# Patient Record
Sex: MALE | Race: BLACK OR AFRICAN AMERICAN | NOT HISPANIC OR LATINO | URBAN - METROPOLITAN AREA
[De-identification: names, ages, dates, MRNs, and addresses within clinical notes are randomized per-mention and may not be internally consistent; named-entity substitution may affect disease eponyms.]

---

## 2018-04-24 NOTE — ASU PATIENT PROFILE, ADULT - VISION (WITH CORRECTIVE LENSES IF THE PATIENT USUALLY WEARS THEM):
weard glasses for distance/Partially impaired: cannot see medication labels or newsprint, but can see obstacles in path, and the surrounding layout; can count fingers at arm's length

## 2018-04-25 ENCOUNTER — OUTPATIENT (OUTPATIENT)
Dept: OUTPATIENT SERVICES | Facility: HOSPITAL | Age: 53
LOS: 1 days | Discharge: HOME | End: 2018-04-25
Payer: COMMERCIAL

## 2018-04-25 VITALS
HEIGHT: 74 IN | OXYGEN SATURATION: 99 % | RESPIRATION RATE: 16 BRPM | HEART RATE: 100 BPM | DIASTOLIC BLOOD PRESSURE: 69 MMHG | SYSTOLIC BLOOD PRESSURE: 117 MMHG | WEIGHT: 222.67 LBS | TEMPERATURE: 97 F

## 2018-04-25 VITALS
DIASTOLIC BLOOD PRESSURE: 73 MMHG | RESPIRATION RATE: 13 BRPM | SYSTOLIC BLOOD PRESSURE: 129 MMHG | HEART RATE: 66 BPM | TEMPERATURE: 98 F | OXYGEN SATURATION: 100 %

## 2018-04-25 LAB — GLUCOSE BLDC GLUCOMTR-MCNC: 157 MG/DL — HIGH (ref 70–99)

## 2018-04-25 PROCEDURE — 54405 INSERT MULTI-COMP PENIS PROS: CPT

## 2018-04-25 PROCEDURE — 54360 PENIS PLASTIC SURGERY: CPT

## 2018-04-25 PROCEDURE — 54235 NJX CORPORA CAVERNOSA RX AGT: CPT

## 2018-04-25 PROCEDURE — C1813: CPT

## 2018-04-25 PROCEDURE — 82962 GLUCOSE BLOOD TEST: CPT

## 2018-04-25 RX ORDER — MORPHINE SULFATE 50 MG/1
2 CAPSULE, EXTENDED RELEASE ORAL
Qty: 0 | Refills: 0 | Status: DISCONTINUED | OUTPATIENT
Start: 2018-04-25 | End: 2018-04-25

## 2018-04-25 RX ORDER — ONDANSETRON 8 MG/1
4 TABLET, FILM COATED ORAL ONCE
Qty: 0 | Refills: 0 | Status: DISCONTINUED | OUTPATIENT
Start: 2018-04-25 | End: 2018-04-25

## 2018-04-25 RX ORDER — GENTAMICIN SULFATE 40 MG/ML
80 VIAL (ML) INJECTION ONCE
Qty: 0 | Refills: 0 | Status: DISCONTINUED | OUTPATIENT
Start: 2018-04-25 | End: 2018-04-25

## 2018-04-25 RX ORDER — OXYCODONE AND ACETAMINOPHEN 5; 325 MG/1; MG/1
1 TABLET ORAL ONCE
Qty: 0 | Refills: 0 | Status: DISCONTINUED | OUTPATIENT
Start: 2018-04-25 | End: 2018-04-25

## 2018-04-25 RX ORDER — SODIUM CHLORIDE 9 MG/ML
1000 INJECTION INTRAMUSCULAR; INTRAVENOUS; SUBCUTANEOUS
Qty: 0 | Refills: 0 | Status: DISCONTINUED | OUTPATIENT
Start: 2018-04-25 | End: 2018-04-25

## 2018-04-25 RX ORDER — VANCOMYCIN HCL 1 G
1500 VIAL (EA) INTRAVENOUS EVERY 12 HOURS
Qty: 0 | Refills: 0 | Status: DISCONTINUED | OUTPATIENT
Start: 2018-04-25 | End: 2018-04-25

## 2018-04-25 RX ADMIN — Medication 300 MILLIGRAM(S): at 08:46

## 2018-04-25 NOTE — BRIEF OPERATIVE NOTE - OPERATION/FINDINGS
Difficult dissection/dilation distally. Coloplast Titan 20cm with no rear tip. 75cc reservoir placed in left space of retzius

## 2018-04-25 NOTE — BRIEF OPERATIVE NOTE - PROCEDURE
<<-----Click on this checkbox to enter Procedure Insertion of inflatable penile prosthesis  04/25/2018    Active  [place]

## 2021-01-04 NOTE — ASU PREOP CHECKLIST - ISOLATION PRECAUTIONS
none Render Risk Assessment In Note?: no Other (Free Text): [facility]erral to [facility] if pt wants removed completely. Detail Level: Simple Note Text (......Xxx Chief Complaint.): This diagnosis correlates with the

## 2022-09-07 ENCOUNTER — EMERGENCY (EMERGENCY)
Facility: HOSPITAL | Age: 57
LOS: 1 days | Discharge: HOME | End: 2022-09-07
Attending: STUDENT IN AN ORGANIZED HEALTH CARE EDUCATION/TRAINING PROGRAM | Admitting: STUDENT IN AN ORGANIZED HEALTH CARE EDUCATION/TRAINING PROGRAM
Payer: COMMERCIAL

## 2022-09-07 VITALS
DIASTOLIC BLOOD PRESSURE: 76 MMHG | SYSTOLIC BLOOD PRESSURE: 187 MMHG | HEART RATE: 65 BPM | HEIGHT: 74 IN | TEMPERATURE: 98 F | RESPIRATION RATE: 18 BRPM | OXYGEN SATURATION: 99 %

## 2022-09-07 PROBLEM — I63.9 CEREBRAL INFARCTION, UNSPECIFIED: Chronic | Status: ACTIVE | Noted: 2018-04-24

## 2022-09-07 PROBLEM — E11.9 TYPE 2 DIABETES MELLITUS WITHOUT COMPLICATIONS: Chronic | Status: ACTIVE | Noted: 2018-04-24

## 2022-09-07 PROBLEM — G62.9 POLYNEUROPATHY, UNSPECIFIED: Chronic | Status: ACTIVE | Noted: 2018-04-24

## 2022-09-07 PROBLEM — I10 ESSENTIAL (PRIMARY) HYPERTENSION: Chronic | Status: ACTIVE | Noted: 2018-04-24

## 2022-09-07 LAB
ALBUMIN SERPL ELPH-MCNC: 4.1 G/DL — SIGNIFICANT CHANGE UP (ref 3.3–5)
ALP SERPL-CCNC: 102 U/L — SIGNIFICANT CHANGE UP (ref 40–120)
ALT FLD-CCNC: 11 U/L — SIGNIFICANT CHANGE UP (ref 10–45)
ANION GAP SERPL CALC-SCNC: 12 MMOL/L — SIGNIFICANT CHANGE UP (ref 5–17)
APTT BLD: 33 SEC — SIGNIFICANT CHANGE UP (ref 27.5–35.5)
AST SERPL-CCNC: 14 U/L — SIGNIFICANT CHANGE UP (ref 10–40)
BASOPHILS # BLD AUTO: 0.03 K/UL — SIGNIFICANT CHANGE UP (ref 0–0.2)
BASOPHILS NFR BLD AUTO: 0.3 % — SIGNIFICANT CHANGE UP (ref 0–2)
BILIRUB SERPL-MCNC: 0.6 MG/DL — SIGNIFICANT CHANGE UP (ref 0.2–1.2)
BLD GP AB SCN SERPL QL: NEGATIVE — SIGNIFICANT CHANGE UP
BUN SERPL-MCNC: 17 MG/DL — SIGNIFICANT CHANGE UP (ref 7–23)
CALCIUM SERPL-MCNC: 9.5 MG/DL — SIGNIFICANT CHANGE UP (ref 8.4–10.5)
CHLORIDE SERPL-SCNC: 101 MMOL/L — SIGNIFICANT CHANGE UP (ref 96–108)
CO2 SERPL-SCNC: 27 MMOL/L — SIGNIFICANT CHANGE UP (ref 22–31)
CREAT SERPL-MCNC: 1.14 MG/DL — SIGNIFICANT CHANGE UP (ref 0.5–1.3)
EGFR: 75 ML/MIN/1.73M2 — SIGNIFICANT CHANGE UP
EOSINOPHIL # BLD AUTO: 0.12 K/UL — SIGNIFICANT CHANGE UP (ref 0–0.5)
EOSINOPHIL NFR BLD AUTO: 1.3 % — SIGNIFICANT CHANGE UP (ref 0–6)
GLUCOSE SERPL-MCNC: 185 MG/DL — HIGH (ref 70–99)
HCT VFR BLD CALC: 37.4 % — LOW (ref 39–50)
HGB BLD-MCNC: 12.2 G/DL — LOW (ref 13–17)
IMM GRANULOCYTES NFR BLD AUTO: 0.4 % — SIGNIFICANT CHANGE UP (ref 0–1.5)
INR BLD: 0.98 — SIGNIFICANT CHANGE UP (ref 0.88–1.16)
LYMPHOCYTES # BLD AUTO: 2.13 K/UL — SIGNIFICANT CHANGE UP (ref 1–3.3)
LYMPHOCYTES # BLD AUTO: 23.2 % — SIGNIFICANT CHANGE UP (ref 13–44)
MCHC RBC-ENTMCNC: 27.8 PG — SIGNIFICANT CHANGE UP (ref 27–34)
MCHC RBC-ENTMCNC: 32.6 GM/DL — SIGNIFICANT CHANGE UP (ref 32–36)
MCV RBC AUTO: 85.2 FL — SIGNIFICANT CHANGE UP (ref 80–100)
MONOCYTES # BLD AUTO: 0.68 K/UL — SIGNIFICANT CHANGE UP (ref 0–0.9)
MONOCYTES NFR BLD AUTO: 7.4 % — SIGNIFICANT CHANGE UP (ref 2–14)
NEUTROPHILS # BLD AUTO: 6.17 K/UL — SIGNIFICANT CHANGE UP (ref 1.8–7.4)
NEUTROPHILS NFR BLD AUTO: 67.4 % — SIGNIFICANT CHANGE UP (ref 43–77)
NRBC # BLD: 0 /100 WBCS — SIGNIFICANT CHANGE UP (ref 0–0)
PLATELET # BLD AUTO: 293 K/UL — SIGNIFICANT CHANGE UP (ref 150–400)
POTASSIUM SERPL-MCNC: 3.6 MMOL/L — SIGNIFICANT CHANGE UP (ref 3.5–5.3)
POTASSIUM SERPL-SCNC: 3.6 MMOL/L — SIGNIFICANT CHANGE UP (ref 3.5–5.3)
PROT SERPL-MCNC: 7.5 G/DL — SIGNIFICANT CHANGE UP (ref 6–8.3)
PROTHROM AB SERPL-ACNC: 11.6 SEC — SIGNIFICANT CHANGE UP (ref 10.5–13.4)
RBC # BLD: 4.39 M/UL — SIGNIFICANT CHANGE UP (ref 4.2–5.8)
RBC # FLD: 13.5 % — SIGNIFICANT CHANGE UP (ref 10.3–14.5)
RH IG SCN BLD-IMP: POSITIVE — SIGNIFICANT CHANGE UP
SARS-COV-2 RNA SPEC QL NAA+PROBE: NEGATIVE — SIGNIFICANT CHANGE UP
SODIUM SERPL-SCNC: 140 MMOL/L — SIGNIFICANT CHANGE UP (ref 135–145)
TROPONIN T SERPL-MCNC: 0.01 NG/ML — SIGNIFICANT CHANGE UP (ref 0–0.01)
WBC # BLD: 9.17 K/UL — SIGNIFICANT CHANGE UP (ref 3.8–10.5)
WBC # FLD AUTO: 9.17 K/UL — SIGNIFICANT CHANGE UP (ref 3.8–10.5)

## 2022-09-07 PROCEDURE — 99285 EMERGENCY DEPT VISIT HI MDM: CPT | Mod: 25

## 2022-09-07 PROCEDURE — 96374 THER/PROPH/DIAG INJ IV PUSH: CPT

## 2022-09-07 PROCEDURE — 80053 COMPREHEN METABOLIC PANEL: CPT

## 2022-09-07 PROCEDURE — 85730 THROMBOPLASTIN TIME PARTIAL: CPT

## 2022-09-07 PROCEDURE — 70450 CT HEAD/BRAIN W/O DYE: CPT | Mod: MA

## 2022-09-07 PROCEDURE — 96376 TX/PRO/DX INJ SAME DRUG ADON: CPT

## 2022-09-07 PROCEDURE — 72146 MRI CHEST SPINE W/O DYE: CPT | Mod: MA

## 2022-09-07 PROCEDURE — 93005 ELECTROCARDIOGRAM TRACING: CPT

## 2022-09-07 PROCEDURE — 85610 PROTHROMBIN TIME: CPT

## 2022-09-07 PROCEDURE — 70496 CT ANGIOGRAPHY HEAD: CPT | Mod: 26,MA

## 2022-09-07 PROCEDURE — 86900 BLOOD TYPING SEROLOGIC ABO: CPT

## 2022-09-07 PROCEDURE — 87635 SARS-COV-2 COVID-19 AMP PRB: CPT

## 2022-09-07 PROCEDURE — 70496 CT ANGIOGRAPHY HEAD: CPT | Mod: MA

## 2022-09-07 PROCEDURE — 70498 CT ANGIOGRAPHY NECK: CPT | Mod: MA

## 2022-09-07 PROCEDURE — 86850 RBC ANTIBODY SCREEN: CPT

## 2022-09-07 PROCEDURE — 72148 MRI LUMBAR SPINE W/O DYE: CPT | Mod: MA

## 2022-09-07 PROCEDURE — 0042T: CPT | Mod: MA

## 2022-09-07 PROCEDURE — 82962 GLUCOSE BLOOD TEST: CPT

## 2022-09-07 PROCEDURE — 97162 PT EVAL MOD COMPLEX 30 MIN: CPT

## 2022-09-07 PROCEDURE — 70498 CT ANGIOGRAPHY NECK: CPT | Mod: 26,MA

## 2022-09-07 PROCEDURE — 72148 MRI LUMBAR SPINE W/O DYE: CPT | Mod: 26,MA

## 2022-09-07 PROCEDURE — 84484 ASSAY OF TROPONIN QUANT: CPT

## 2022-09-07 PROCEDURE — 72146 MRI CHEST SPINE W/O DYE: CPT | Mod: 26,MA

## 2022-09-07 PROCEDURE — 86901 BLOOD TYPING SEROLOGIC RH(D): CPT

## 2022-09-07 PROCEDURE — 71045 X-RAY EXAM CHEST 1 VIEW: CPT

## 2022-09-07 PROCEDURE — 71045 X-RAY EXAM CHEST 1 VIEW: CPT | Mod: 26

## 2022-09-07 PROCEDURE — 85025 COMPLETE CBC W/AUTO DIFF WBC: CPT

## 2022-09-07 PROCEDURE — 99283 EMERGENCY DEPT VISIT LOW MDM: CPT

## 2022-09-07 PROCEDURE — 36415 COLL VENOUS BLD VENIPUNCTURE: CPT

## 2022-09-07 PROCEDURE — 96375 TX/PRO/DX INJ NEW DRUG ADDON: CPT

## 2022-09-07 RX ORDER — MORPHINE SULFATE 50 MG/1
2 CAPSULE, EXTENDED RELEASE ORAL ONCE
Refills: 0 | Status: DISCONTINUED | OUTPATIENT
Start: 2022-09-07 | End: 2022-09-07

## 2022-09-07 RX ORDER — METHOCARBAMOL 500 MG/1
1500 TABLET, FILM COATED ORAL ONCE
Refills: 0 | Status: COMPLETED | OUTPATIENT
Start: 2022-09-07 | End: 2022-09-07

## 2022-09-07 RX ORDER — KETOROLAC TROMETHAMINE 30 MG/ML
15 SYRINGE (ML) INJECTION ONCE
Refills: 0 | Status: DISCONTINUED | OUTPATIENT
Start: 2022-09-07 | End: 2022-09-07

## 2022-09-07 RX ORDER — ACETAMINOPHEN 500 MG
975 TABLET ORAL ONCE
Refills: 0 | Status: COMPLETED | OUTPATIENT
Start: 2022-09-07 | End: 2022-09-07

## 2022-09-07 RX ADMIN — Medication 975 MILLIGRAM(S): at 19:13

## 2022-09-07 RX ADMIN — METHOCARBAMOL 1500 MILLIGRAM(S): 500 TABLET, FILM COATED ORAL at 15:41

## 2022-09-07 RX ADMIN — Medication 15 MILLIGRAM(S): at 16:42

## 2022-09-07 RX ADMIN — MORPHINE SULFATE 2 MILLIGRAM(S): 50 CAPSULE, EXTENDED RELEASE ORAL at 19:13

## 2022-09-07 RX ADMIN — Medication 15 MILLIGRAM(S): at 15:40

## 2022-09-07 RX ADMIN — Medication 975 MILLIGRAM(S): at 19:44

## 2022-09-07 RX ADMIN — MORPHINE SULFATE 2 MILLIGRAM(S): 50 CAPSULE, EXTENDED RELEASE ORAL at 19:44

## 2022-09-07 RX ADMIN — Medication 15 MILLIGRAM(S): at 22:15

## 2022-09-07 NOTE — ED PROVIDER NOTE - CLINICAL SUMMARY MEDICAL DECISION MAKING FREE TEXT BOX
Patient with high suspicion for ischemic vs hemorrhagic stroke.     Stroke code activated    Work up: CT head and CT perfusion, EKG, CBC, BMP, VBG.    R/o metabolic and peripheral causes of symptoms     Possible msk etiology as pt has right lumbar muscular spasms that could be contributing to symptoms. Patient with high suspicion for ischemic vs hemorrhagic stroke.   Stroke code activated at triage  Work up: CT head, CTA, and CT perfusion, EKG, CBC, CMP  R/o metabolic and peripheral causes of symptoms  Possible msk etiology as pt has right lumbar muscular spasms that could be contributing to symptoms.

## 2022-09-07 NOTE — ED PROVIDER NOTE - PHYSICAL EXAMINATION
CONSTITUTIONAL: Non-toxic; in no apparent distress, mild dysarthria (baseline)  HEAD: Normocephalic; atraumatic  EYES: PERRL; EOM intact   ENMT: External appears normal  NECK: Supple; non-tender  CARD: Normal S1, S2; no murmurs, rubs, or gallops  RESP: Normal chest excursion with respiration; breath sounds clear and equal bilaterally  ABD: Soft, non-distended; non-tender  EXT: Normal ROM in all four extremities; non-tender to palpation, + RLE straight leg raise positive  SKIN: Warm, dry, no rash  NEURO: No focal neurological deficiencies, CN 2-12 intact BL, no dysmetria, no pronator drift, strength/sensation intact throughout, normal cognition

## 2022-09-07 NOTE — PHYSICAL THERAPY INITIAL EVALUATION ADULT - BALANCE TRAINING, PT EVAL
Pt will be able to put dress/shirt on while standing independently 4/4 times without LOB   Pt will be able to don socks independently while sitting 4/4 times without LOB

## 2022-09-07 NOTE — PHYSICAL THERAPY INITIAL EVALUATION ADULT - DIAGNOSIS, PT EVAL
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

## 2022-09-07 NOTE — ED PROVIDER NOTE - PATIENT PORTAL LINK FT
You can access the FollowMyHealth Patient Portal offered by [facility] by registering at the following website: [email or web address]. By joining [employer]’s FollowMyHealth portal, you will also be able to view your health information using other applications (apps) compatible with our system.

## 2022-09-07 NOTE — PHYSICAL THERAPY INITIAL EVALUATION ADULT - MODALITIES TREATMENT COMMENTS
(L) hand  4/5, (R) hand  5/5. CN Testing: B/L Frontalis intact; B/L buccinator intact; smile symmetrical; tongue protrusion at midline; B/L eyes open/close intact; Shoulder elevation: intact bilaterally; Vision H-Test: bilateral tracking and smooth pursuit intact; Convergence/Divergence: intact; Vision Quadrant Test: intact bilaterally Lethargic, AAO x2 (himself, hospital (reports [facility]), Sep 10, 2022). Patient p/w decreased LUE strength (MMT 4/5 t/o, baseline), b/l LEs and RUE (MMT 5/5) otherwise intact neuro exam. Patient required Mod A for bed mobilities. Session was limited to dangling on edge of bed for 10 minutes due to c/o 10/10 in LBP radiating into right gluteal area (started this morning). Emotional. Stable BP. Well saturated on RA. Denies dizziness, SOB, Headache, chest pain. Will benefit from skilled PT to optimize functional independence.

## 2022-09-07 NOTE — ED PROVIDER NOTE - PROGRESS NOTE DETAILS
Stroke signed off, no concern for central cause of symptoms. Pt has MSK tightness in paraspinal lumbar back, will give mm relaxers and re-assess. Pt evaluated by PT, however could not stand 2/2 discomfort and possible weakness of RLE. He notes ongoing urinary incontinence for past 2 mo for which he wears depends - he states he has not shared these symptoms with his neurology team. No saddle paresthesia, bowel symptoms, fever, night sweats, weight loss.  Considering urinary symptoms, weakness, and RLE numbness, will consult neurosurgery and obtain MRI of T and L spine. Pt ambulatory in ED, using cane. MRI neg, results discussed w NSx. Will dc w Nsx f/u and return precautions.

## 2022-09-07 NOTE — ED PROVIDER NOTE - NSFOLLOWUPINSTRUCTIONS_ED_ALL_ED_FT
Follow up with your primary medical doctor as soon as possible.  Follow up with neurosurgery - call [phone] to schedule an appointment.  Return to the emergency department if your symptoms worsen or if you develop new symptoms.    Sciatica  ImageSciatica is pain, numbness, weakness, or tingling along the path of the sciatic nerve. The sciatic nerve starts in the lower back and runs down the back of each leg. The nerve controls the muscles in the lower leg and in the back of the knee. It also provides feeling (sensation) to the back of the thigh, the lower leg, and the sole of the foot. Sciatica is a symptom of another medical condition that pinches or puts pressure on the sciatic nerve.    Generally, sciatica only affects one side of the body. Sciatica usually goes away on its own or with treatment. In some cases, sciatica may keep coming back (recur).    What are the causes?  This condition is caused by pressure on the sciatic nerve, or pinching of the sciatic nerve. This may be the result of:    A disk in between the bones of the spine (vertebrae) bulging out too far (herniated disk).  Age-related changes in the spinal disks (degenerative disk disease).  A pain disorder that affects a muscle in the buttock (piriformis syndrome).  Extra bone growth (bone spur) near the sciatic nerve.  An injury or break (fracture) of the pelvis.  Pregnancy.  Tumor (rare).    What increases the risk?  The following factors may make you more likely to develop this condition:    Playing sports that place pressure or stress on the spine, such as football or weight lifting.  Having poor strength and flexibility.  A history of back injury.  A history of back surgery.  Sitting for long periods of time.  Doing activities that involve repetitive bending or lifting.  Obesity.    What are the signs or symptoms?  Symptoms can vary from mild to very severe, and they may include:    Any of these problems in the lower back, leg, hip, or buttock:    Mild tingling or dull aches.  Burning sensations.  Sharp pains.    Numbness in the back of the calf or the sole of the foot.  Leg weakness.  Severe back pain that makes movement difficult.    These symptoms may get worse when you cough, sneeze, or laugh, or when you sit or stand for long periods of time. Being overweight may also make symptoms worse. In some cases, symptoms may recur over time.    How is this diagnosed?  This condition may be diagnosed based on:    Your symptoms.  A physical exam. Your health care provider may ask you to do certain movements to check whether those movements trigger your symptoms.  You may have tests, including:    Blood tests.  X-rays.  MRI.  CT scan.      How is this treated?  In many cases, this condition improves on its own, without any treatment. However, treatment may include:    Reducing or modifying physical activity during periods of pain.  Exercising and stretching to strengthen your abdomen and improve the flexibility of your spine.  Icing and applying heat to the affected area.  Medicines that help:    To relieve pain and swelling.  To relax your muscles.    Injections of medicines that help to relieve pain, irritation, and inflammation around the sciatic nerve (steroids).  Surgery.    Follow these instructions at home:  Medicines     Take over-the-counter and prescription medicines only as told by your health care provider.  Do not drive or operate heavy machinery while taking prescription pain medicine.  Managing pain     If directed, apply ice to the affected area.    Put ice in a plastic bag.  Place a towel between your skin and the bag.  Leave the ice on for 20 minutes, 2–3 times a day.    After icing, apply heat to the affected area before you exercise or as often as told by your health care provider. Use the heat source that your health care provider recommends, such as a moist heat pack or a heating pad.    Place a towel between your skin and the heat source.  Leave the heat on for 20–30 minutes.  Remove the heat if your skin turns bright red. This is especially important if you are unable to feel pain, heat, or cold. You may have a greater risk of getting burned.    Activity     Return to your normal activities as told by your health care provider. Ask your health care provider what activities are safe for you.    Avoid activities that make your symptoms worse.    Take brief periods of rest throughout the day. Resting in a lying or standing position is usually better than sitting to rest.    When you rest for longer periods, mix in some mild activity or stretching between periods of rest. This will help to prevent stiffness and pain.  Avoid sitting for long periods of time without moving. Get up and move around at least one time each hour.    Exercise and stretch regularly, as told by your health care provider.  Do not lift anything that is heavier than 10 lb (4.5 kg) while you have symptoms of sciatica. When you do not have symptoms, you should still avoid heavy lifting, especially repetitive heavy lifting.  When you lift objects, always use proper lifting technique, which includes:    Bending your knees.  Keeping the load close to your body.  Avoiding twisting.    General instructions     Use good posture.    Avoid leaning forward while sitting.  Avoid hunching over while standing.    Maintain a healthy weight. Excess weight puts extra stress on your back and makes it difficult to maintain good posture.  Wear supportive, comfortable shoes. Avoid wearing high heels.  Avoid sleeping on a mattress that is too soft or too hard. A mattress that is firm enough to support your back when you sleep may help to reduce your pain.  Keep all follow-up visits as told by your health care provider. This is important.  Contact a health care provider if:  You have pain that wakes you up when you are sleeping.  You have pain that gets worse when you lie down.  Your pain is worse than you have experienced in the past.  Your pain lasts longer than 4 weeks.  You experience unexplained weight loss.  Get help right away if:  You lose control of your bowel or bladder (incontinence).  You have:    Weakness in your lower back, pelvis, buttocks, or legs that gets worse.  Redness or swelling of your back.  A burning sensation when you urinate.    This information is not intended to replace advice given to you by your health care provider. Make sure you discuss any questions you have with your health care provider.

## 2022-09-07 NOTE — PHYSICAL THERAPY INITIAL EVALUATION ADULT - ADDITIONAL COMMENTS
Patient lives with sister in private house with 20 steps to enter. Patient employed in [employer]. Does not use any Assistive Devices at baseline. Owns RW. Denies recent Hx of falls.

## 2022-09-07 NOTE — CONSULT NOTE ADULT - SUBJECTIVE AND OBJECTIVE BOX
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 States his right leg gave out at  while he was walking with a cane. Pt describes similar episode of right buttock/right leg sciatic type pains in April 2022 that resolved on their own. Reports that he did not take any medication for the pain and that the pain is cramping in nature, 10/10 at its worse and intermittent. He cannot describe any aggravating or alleviating factors in correlation to the pain. He also reports that he has had some intermittent urinary incontinence over the past 2 months, but denies fecal incontinence or saddle anesthesia. Pt also noted right lumbar muscular tightness / spasm since this morning.     Denies weakness, changes in sensation, fecal incontinence, saddle anesthesia, dizziness, vision changes, headache, or recent trauma, falls or inciting events.     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 Has 2 flights of stairs at home, intermittently uses a cane at home.     Tobacco Use: denies   EtOH use: Socially  Substance: denies     Allergies    No Known Allergies    Intolerances        REVIEW OF SYSTEMS  General:	  All ROS negative except those listed in above HPI       MEDICATIONS:  Antibiotics:    Neuro:    Anticoagulation:    OTHER:    IVF:      Vital Signs Last 24 Hrs  T(C): 36.4 (07 Sep 2022 18:41), Max: 36.7 (07 Sep 2022 14:03)  T(F): 97.6 (07 Sep 2022 18:41), Max: 98.1 (07 Sep 2022 14:03)  HR: 64 (07 Sep 2022 18:41) (55 - 74)  BP: 166/94 (07 Sep 2022 18:41) (140/74 - 187/76)  BP(mean): --  RR: 18 (07 Sep 2022 18:41) (18 - 18)  SpO2: 98% (07 Sep 2022 18:41) (95% - 100%)    Parameters below as of 07 Sep 2022 18:41  Patient On (Oxygen Delivery Method): room air      PHYSICAL EXAM:  General: NAD, pt is comfortably sitting up in bed, on room air  HEENT: CN II-XII grossly intact, PERRL 3mm briskly reactive, EOMI b/l, face symmetric, tongue midline, neck FROM  Cardiovascular: RRR, normal S1 and S2   Respiratory: lungs CTAB, no wheezing, rhonchi, or crackles   GI: normoactive BS to auscultation, abd soft, NTND   Neuro: A&Ox3, No aphasia, speech clear, no dysmetria, no pronator drift. Follows commands. GU x4 spontaneously, 5/5 strength in all extremities throughout. SILT throughout. +Right lumbar paraspinal tenderness to palpation. Negative Martin's or clonus, normoreflexic throughout.    Extremities: distal pulses 2+ x4    LABS:                        12.2   9.17  )-----------( 293      ( 07 Sep 2022 14:32 )             37.4     09-07    140  |  101  |  17  ----------------------------<  185<H>  3.6   |  27  |  1.14    Ca    9.5      07 Sep 2022 14:32    TPro  7.5  /  Alb  4.1  /  TBili  0.6  /  DBili  x   /  AST  14  /  ALT  11  /  AlkPhos  102  09-07    PT/INR - ( 07 Sep 2022 14:32 )   PT: 11.6 sec;   INR: 0.98          PTT - ( 07 Sep 2022 14:32 )  PTT:33.0 sec    CULTURES:      RADIOLOGY & ADDITIONAL STUDIES:  MRI lumbar pending     Assessment:  58 y/o M with PMHx DM, HTN, 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 D'Amico

## 2022-09-07 NOTE — ED PROVIDER NOTE - OBJECTIVE STATEMENT
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 Last known normal was prior to 8am this morning. Pt states his right leg gave out at 8am while he was walking with a cane. Pt also c/o numbness to right leg which he has not felt since April during prior CVA. No changes in speech from baseline per family. Denies other weakness or numbness. Pt also noted right lumbar muscular tightness / spasm since this morning. Denies dizziness, vision changes, headache, or recent trauma. 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 Last known normal was prior to 8am this morning. Pt states his right leg gave out at 8am while he was walking with a cane. Pt also c/o numbness to right leg that started at the same time which he has not felt since April during prior CVA. No changes in speech from baseline per family. Denies other weakness or numbness. Pt also noted right lumbar muscular tightness / spasm since this morning. Denies dizziness, vision changes, headache, or recent trauma.

## 2022-09-07 NOTE — ED ADULT NURSE NOTE - NSICDXPASTMEDICALHX_GEN_ALL_CORE_FT
PAST MEDICAL HISTORY:  DM (diabetes mellitus)     HTN (hypertension)     Peripheral neuritis     Stroke right hemiparesis

## 2022-09-07 NOTE — ED ADULT TRIAGE NOTE - CHIEF COMPLAINT QUOTE
Pt c/o right-sided weakness and leg "giving out" at 0800 this morning. Pt worked overnight and noticed the symptoms started at that time. Pt has hx of CVA x2, most recently this year, with residual left-sided weakness and slurred speech. Pt states he can normally walk around despite weakness, but is now unable to walk d/t weakness and numbness to right leg. Denies dizziness, vision changes, headache.

## 2022-09-07 NOTE — PHYSICAL THERAPY INITIAL EVALUATION ADULT - PERTINENT HX OF CURRENT PROBLEM, REHAB EVAL
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 Last known normal was prior to 8am this morning. Pt states his right leg gave out at 8am while he was walking with a cane. Pt also c/o numbness to right leg which he has not felt since April during prior CVA. No changes in speech from baseline per family. Denies other weakness or numbness. Pt also noted right lumbar muscular tightness / spasm since this morning. Denies dizziness, vision changes, headache, or recent trauma.

## 2022-09-07 NOTE — CONSULT NOTE ADULT - SUBJECTIVE AND OBJECTIVE BOX
**STROKE CODE CONSULT NOTE**    Last known well time: 0800    HPI: 57y Male with PMHx of T2DM, CVA with residual, aphasia, dysarthria (2017), peripheral neuritis presents to ED c/o right leg weakness. LKW 0800. Pt states he was walking with his cane and felt a sharp pain in the right hip that shot down to his foot. Pt felt his right leg giving out and buckling underneath him due to the pain, then started to experience numbness in his right leg. Pt states he had a similar feeling in April. Pt also c/o right lumbar "spasm" by his hip since morning and c/o pain to this area. Stroke code called on arrival, NIHSS 2 for baseline dysarthria and word finding difficulties. CTH negative for acute findings. CTP some slowing likely artifact, CTA H/N negative. Pt is not a candidate for alteplase as he is out of the window. Pt states numbness has resolved and is c/o right hip/lumbar pain. Denies weakness/numbness of extremities, headache, blurry vision, dizziness.     T(C): 36.7 (09-07-22 @ 15:09), Max: 36.7 (09-07-22 @ 14:03)  HR: 55 (09-07-22 @ 15:09) (55 - 72)  BP: 140/74 (09-07-22 @ 15:09) (140/74 - 187/76)  RR: 18 (09-07-22 @ 15:09) (18 - 18)  SpO2: 95% (09-07-22 @ 15:09) (95% - 100%)    PAST MEDICAL & SURGICAL HISTORY:  DM (diabetes mellitus)      HTN (hypertension)      Stroke  right hemiparesis      Peripheral neuritis      No significant past surgical history          FAMILY HISTORY:      SOCIAL HISTORY:   Smoking status: denies  Drinking: denies  Drug Use:  denies    ROS:   Constitutional: No fever, weight loss or fatigue  Eyes: No eye pain, visual disturbances, or discharge  ENMT:  No difficulty hearing, tinnitus; No sinus or throat pain  Neck: No pain or stiffness  Respiratory: No cough, wheezing, chills or hemoptysis  Cardiovascular: No chest pain, palpitations, shortness of breath, or leg swelling  Gastrointestinal: No abdominal pain. No nausea, vomiting or hematemesis; No diarrhea or constipation. Nohematochezia.  Genitourinary: No dysuria, frequency, hematuria or incontinence  Neurological: As per HPI  Skin: No itching, burning, rashes or lesions   Endocrine: No heat or cold intolerance; No hair loss  Musculoskeletal: No joint pain or swelling; No muscle, back or extremity pain  Heme/Lymph: No easy bruising or bleeding gums    MEDICATIONS  (STANDING):    MEDICATIONS  (PRN):    Allergies    No Known Allergies    Intolerances      Vital Signs Last 24 Hrs  T(C): 36.7 (07 Sep 2022 15:09), Max: 36.7 (07 Sep 2022 14:03)  T(F): 98 (07 Sep 2022 15:09), Max: 98.1 (07 Sep 2022 14:03)  HR: 55 (07 Sep 2022 15:09) (55 - 72)  BP: 140/74 (07 Sep 2022 15:09) (140/74 - 187/76)  BP(mean): --  RR: 18 (07 Sep 2022 15:09) (18 - 18)  SpO2: 95% (07 Sep 2022 15:09) (95% - 100%)    Parameters below as of 07 Sep 2022 14:21  Patient On (Oxygen Delivery Method): room air        Physical exam:  Constitutional: No acute distress, conversant    Neurologic:  -Mental status: Awake, alert, oriented to person, place, and time. Speech is fluent with intact naming, repetition, and comprehension, mild dysarthria (baseline). Some word-finding difficulties at times baseline from prior stroke.  Follows commands.   -Cranial nerves:   II: Visual fields are full to confrontation.  III, IV, VI: Extraocular movements are intact without nystagmus. Pupils equally round and reactive to light  V:  Facial sensation V1-V3 equal and intact   VII: Face is symmetric with normal eye closure and smile  VIII: Hearing is grossly intact  XII: Tongue protrudes midline  Motor: Normal bulk and tone. No pronator drift. Strength bilateral upper extremity 5/5, bilateral lower extremities 5/5.  Sensation: Intact to light touch bilaterally. No neglect or extinction on double simultaneous testing.  Coordination: No dysmetria on finger-to-nose and heel-to-shin bilaterally  Reflexes: Downgoing toes bilaterally   Gait: Narrow gait and steady    NIHSS: 2    Fingerstick Blood Glucose: CAPILLARY BLOOD GLUCOSE  198 (07 Sep 2022 17:34)      POCT Blood Glucose.: 198 mg/dL (07 Sep 2022 14:00)    LABS:                        12.2   9.17  )-----------( 293      ( 07 Sep 2022 14:32 )             37.4     09-07    140  |  101  |  17  ----------------------------<  185<H>  3.6   |  27  |  1.14    Ca    9.5      07 Sep 2022 14:32    TPro  7.5  /  Alb  4.1  /  TBili  0.6  /  DBili  x   /  AST  14  /  ALT  11  /  AlkPhos  102  09-07    PT/INR - ( 07 Sep 2022 14:32 )   PT: 11.6 sec;   INR: 0.98          PTT - ( 07 Sep 2022 14:32 )  PTT:33.0 sec  CARDIAC MARKERS ( 07 Sep 2022 14:32 )  x     / 0.01 ng/mL / x     / x     / x        RADIOLOGY & ADDITIONAL STUDIES:    < from: CT Brain Stroke Protocol (09.07.22 @ 14:19) >  Impression:    No acute intracranial hemorrhage, mass effect or large demarcated   territorial infarction.Chronic lacunar infarctions and microvascular   ischemic changes are noted..    < from: CT Brain Perfusion Maps Stroke (09.07.22 @ 14:20) >  IMPRESSION: Small volume elevated Tmax which may be artifactual.   Otherwise unremarkable exam.    < from: CT Angio Brain Stroke Protocol  w/ IV Cont (09.07.22 @ 14:21) >  IMPRESSION:    Intracranial CTA: No large vessel occlusion or high-grade stenosis.    Extracranial CTA: No steno-occlusive disease    -----------------------------------------------------------------------------------------------------------------  IV-tPA (Y/N): N                            Bolus time:    Alteplase Dose Verification w/ RN:  Reason IV-tPA not given: out of window

## 2022-09-07 NOTE — CONSULT NOTE ADULT - NSCONSULTADDITIONALINFOA_GEN_ALL_CORE
Vascular Neurology Fellow Attestation:  Patient seen and examined with stroke team and agree with above. 56yo man with intermittent RLE weakness in the setting of shooting lower back pain, suspect secondary to lumbar radiculopathy rather than acute stroke given description and unremarkable CT imaging. No indication for further inpatient stroke workup at this time. Continue aspirin and statin for secondary prevention given history of prior stroke.    Discussed with attending, Dr. Lipscomb

## 2022-09-07 NOTE — ED PROVIDER NOTE - NS ED ROS FT
CONSTITUTIONAL: No fever, no chills, no fatigue  EYES: No eye redness, no visual changes  ENT: No ear pain, no sore throat  CARDIOVASCULAR: No chest pain, no palpitations  RESPIRATORY: No cough, no SOB  GI: No abdominal pain, no nausea, no vomiting, no constipation, no diarrhea  GENITOURINARY: No dysuria, no frequency, no hematuria  MUSCULOSKELETAL: No back pain, no joint pain, no myalgias  SKIN: No rash, no peripheral edema   NEURO: No headache, no confusion +right leg weakness and numbness    ALL OTHER SYSTEMS NEGATIVE.

## 2022-09-07 NOTE — CONSULT NOTE ADULT - ASSESSMENT
57y Male with PMHx of T2DM, CVA with residual, aphasia, dysarthria (2017), peripheral neuritis presents to ED c/o right leg weakness. LKW 0800. Pt states he was walking with his cane and felt a sharp pain in the right hip that shot down to his foot. Pt felt his right leg giving out and buckling underneath him due to the pain, then started to experience numbness in his right leg. Pt states he had a similar feeling in April. Pt also c/o right lumbar "spasm" by his hip since morning and c/o pain to this area. Stroke code called on arrival, NIHSS 2 for baseline dysarthria and word finding difficulties. CTH negative for acute findings. CTP some slowing likely artifact, CTA H/N negative. Pt is not a candidate for alteplase as he is out of the window. Pt states numbness has resolved and is c/o right hip/lumbar pain.     Right leg weakness/numbness has resolved, episodic weakness/numbness likely 2/2 lumbar pathology as pt is c/o radicular pain from right hip/lower back. No new focal neurologic deficits, right leg is full strength 5/5    Recommend:  - Lumbar pathology workup per ED, may defer to outpatient  - Pain management per Ed  - No further stroke w/u at this time.     Discussed case with Stroke Fellow Dr. Ricardo and Stroke Attending Dr. Lipscomb

## 2022-09-07 NOTE — PHYSICAL THERAPY INITIAL EVALUATION ADULT - SENSORY TESTS
(L) hand  4/5, (R) hand  5/5. CN Testing: B/L Frontalis intact; B/L buccinator intact; smile symmetrical; tongue protrusion at midline; B/L eyes open/close intact; Shoulder elevation: intact bilaterally; Vision H-Test: bilateral tracking and smooth pursuit intact; Convergence/Divergence: intact; Vision Quadrant Test: intact bilaterally

## 2022-09-07 NOTE — ED ADULT NURSE NOTE - OBJECTIVE STATEMENT
Patient BIBEMS via [facility]. Patient reports working last night and experiencing "my right leg giving out at 8 AM." Patient reports hx of CVA x 2 this year. Patient noted with slurred speech-- per patient, speech at baseline. Patient answering questions and following verbal commands appropriately. Patient c/o R leg pain and weakness x 8 AM. Patient immediately brought to CT scan, cardiac monitoring initiated, IV access obtained.

## 2022-09-07 NOTE — ED ADULT NURSE NOTE - INTERVENTIONS DEFINITIONS
[place] to call system/Call bell, personal items and telephone within reach/Instruct patient to call for assistance/Room bathroom lighting operational/Non-slip footwear when patient is off stretcher/Physically safe environment: no spills, clutter or unnecessary equipment/Stretcher in lowest position, wheels locked, appropriate side rails in place/Provide visual cue, wrist band, yellow gown, etc./Monitor gait and stability/Monitor for mental status changes and reorient to person, place, and time/Review medications for side effects contributing to fall risk/Reinforce activity limits and safety measures with patient and family/Provide visual clues: red socks

## 2022-09-07 NOTE — PHYSICAL THERAPY INITIAL EVALUATION ADULT - GENERAL OBSERVATIONS, REHAB EVAL
Patient received semi-ceja in bed  in NAD (somnolent) on RA, +Telemetry, +Heplock. Cleared by COURTNEY Marrero. Agreeable to PT.

## 2022-09-08 VITALS
HEART RATE: 66 BPM | DIASTOLIC BLOOD PRESSURE: 88 MMHG | OXYGEN SATURATION: 98 % | SYSTOLIC BLOOD PRESSURE: 152 MMHG | TEMPERATURE: 98 F | RESPIRATION RATE: 17 BRPM

## 2022-09-10 DIAGNOSIS — R32 UNSPECIFIED URINARY INCONTINENCE: ICD-10-CM

## 2022-09-10 DIAGNOSIS — E11.42 TYPE 2 DIABETES MELLITUS WITH DIABETIC POLYNEUROPATHY: ICD-10-CM

## 2022-09-10 DIAGNOSIS — I10 ESSENTIAL (PRIMARY) HYPERTENSION: ICD-10-CM

## 2022-09-10 DIAGNOSIS — I69.391 DYSPHAGIA FOLLOWING CEREBRAL INFARCTION: ICD-10-CM

## 2022-09-10 DIAGNOSIS — M54.41 LUMBAGO WITH SCIATICA, RIGHT SIDE: ICD-10-CM

## 2022-09-10 DIAGNOSIS — R20.0 ANESTHESIA OF SKIN: ICD-10-CM

## 2022-09-10 DIAGNOSIS — R29.898 OTHER SYMPTOMS AND SIGNS INVOLVING THE MUSCULOSKELETAL SYSTEM: ICD-10-CM

## 2022-09-10 DIAGNOSIS — I69.351 HEMIPLEGIA AND HEMIPARESIS FOLLOWING CEREBRAL INFARCTION AFFECTING RIGHT DOMINANT SIDE: ICD-10-CM

## 2022-09-10 DIAGNOSIS — Z20.822 CONTACT WITH AND (SUSPECTED) EXPOSURE TO COVID-19: ICD-10-CM

## 2022-09-10 DIAGNOSIS — I69.322 DYSARTHRIA FOLLOWING CEREBRAL INFARCTION: ICD-10-CM

## 2022-09-10 DIAGNOSIS — I69.354 HEMIPLEGIA AND HEMIPARESIS FOLLOWING CEREBRAL INFARCTION AFFECTING LEFT NON-DOMINANT SIDE: ICD-10-CM

## 2023-08-01 NOTE — ED ADULT TRIAGE NOTE - GLASGOW COMA SCALE: BEST MOTOR RESPONSE, MLM
Health Maintenance Due   Topic Date Due   • COVID-19 Vaccine (1) Never done   • Depression Screening  Never done   • Meningococcal Vaccine (2 - 2-dose series) 02/06/2022   • Annual Physical (ages 3-18)  12/22/2022       Patient is due for topics as listed above but is not proceeding with Immunization(s) COVID-19 and Meningococcal at this time.   (M6) obeys commands

## 2023-11-14 PROBLEM — Z00.00 ENCOUNTER FOR PREVENTIVE HEALTH EXAMINATION: Status: ACTIVE | Noted: 2023-11-14

## 2024-02-12 ENCOUNTER — APPOINTMENT (OUTPATIENT)
Dept: UROLOGY | Facility: CLINIC | Age: 59
End: 2024-02-12
Payer: SELF-PAY

## 2024-02-12 VITALS
WEIGHT: 250 LBS | BODY MASS INDEX: 33.13 KG/M2 | TEMPERATURE: 96.7 F | DIASTOLIC BLOOD PRESSURE: 78 MMHG | SYSTOLIC BLOOD PRESSURE: 126 MMHG | HEIGHT: 73 IN

## 2024-02-12 DIAGNOSIS — T83.490A OTHER MECHANICAL COMPLICATION OF IMPLANTED PENILE PROSTHESIS, INITIAL ENCOUNTER: ICD-10-CM

## 2024-02-12 DIAGNOSIS — E11.9 TYPE 2 DIABETES MELLITUS W/OUT COMPLICATIONS: ICD-10-CM

## 2024-02-12 DIAGNOSIS — I63.9 CEREBRAL INFARCTION, UNSPECIFIED: ICD-10-CM

## 2024-02-12 DIAGNOSIS — F32.A DEPRESSION, UNSPECIFIED: ICD-10-CM

## 2024-02-12 DIAGNOSIS — Z98.52 VASECTOMY STATUS: ICD-10-CM

## 2024-02-12 DIAGNOSIS — E11.59 TYPE 2 DIABETES MELLITUS WITH OTHER CIRCULATORY COMPLICATIONS: ICD-10-CM

## 2024-02-12 DIAGNOSIS — I69.30 UNSPECIFIED SEQUELAE OF CEREBRAL INFARCTION: ICD-10-CM

## 2024-02-12 DIAGNOSIS — Z96.0 PRESENCE OF UROGENITAL IMPLANTS: ICD-10-CM

## 2024-02-12 DIAGNOSIS — N52.1 TYPE 2 DIABETES MELLITUS WITH OTHER CIRCULATORY COMPLICATIONS: ICD-10-CM

## 2024-02-12 DIAGNOSIS — D57.1 SICKLE-CELL DISEASE W/OUT CRISIS: ICD-10-CM

## 2024-02-12 DIAGNOSIS — I10 ESSENTIAL (PRIMARY) HYPERTENSION: ICD-10-CM

## 2024-02-12 PROCEDURE — 99204 OFFICE O/P NEW MOD 45 MIN: CPT

## 2024-02-12 RX ORDER — SITAGLIPTIN AND METFORMIN HYDROCHLORIDE 50; 1000 MG/1; MG/1
50-1000 TABLET, FILM COATED, EXTENDED RELEASE ORAL
Refills: 0 | Status: ACTIVE | COMMUNITY

## 2024-02-12 NOTE — END OF VISIT
[FreeTextEntry3] :  The complete time calculation (45 minutes) for this patient encounter, includes a review of the patient's past medical records pertinent to his chief complaint, including medical, and surgical history, review of medications taken and of previous visits with other health care providers. In addition to the time spent with the patient, the total time calculation also includes the time necessary to document all of the formation gathered during this session into the patient's electronic health records. [Time Spent: ___ minutes] : I have spent [unfilled] minutes of time on the encounter.

## 2024-02-12 NOTE — PHYSICAL EXAM
[General Appearance - Well Developed] : well developed [General Appearance - Well Nourished] : well nourished [Normal Appearance] : normal appearance [Well Groomed] : well groomed [General Appearance - In No Acute Distress] : no acute distress [Heart Rate And Rhythm] : Heart rate and rhythm were normal [] : no respiratory distress [Bowel Sounds] : normal bowel sounds [Abdomen Soft] : soft [Penis Abnormality] : normal uncircumcised penis [FreeTextEntry1] : The patient is status post insertion of inflatable multicomponent inflatable penile prosthesis in the past.  The overall appearance is excellent there is no edema, swelling or erythema. The incision is well-healed the edges are well approximated there is no discharge.  Cylinders sizing is excellent.  The distal tips are well positioned into the mid glans.  The appearance of the cylinders deflated and the shaft of the penis flaccid is also good.  Scrotal skin is intact the location of the pump is excellent it is in in in the back of the scrotum, well concealed, yet accessible to manipulation.  There is no tethering of the pump.  The reservoir in the lower quadrant of the abdomen is nonpalpable.

## 2024-02-12 NOTE — HISTORY OF PRESENT ILLNESS
[FreeTextEntry1] : This is a an unfortunate 58-year-old gentleman with history of hypertension diabetes and sickle cell trait who has sustained several cerebrovascular stroke since his last visit.  The patient had a multicomponent inflatable penile implant inserted in April 25, 2018.  He had a Coloplast Titan 20 cm device with the touch scrotal pump.  Because of the stroke he is unable to manipulate the device and is unable to obtain a erection firm enough to achieve intercourse.

## 2024-02-12 NOTE — ASSESSMENT
[FreeTextEntry1] : The patient presents with the chief complaint of inability to inflate his penile implant. The patient scheduled this consultation to discuss the different treatment options available for his malfunctioning implant. The following note describes the conversation that was performed today during the consultation.   I reviewed the Patient History Form which the patient filled out.  In discussing penile implant replacement surgery, the patient was made aware of the different types of penile implants- including semirigid devices, 2-piece or Ambicor (AMS) devices, and the inflatable penile prosthesis with 3 components. I went on to mention that there are 2 brands of devices, Coloplast and AMS, and that the AMS is impregnated with antibiotic (inhibizone), and the Coloplast is dipped then coated with an antibiotic. I also referred him to my website in order to obtain additional information about the types of implants available.  He felt he would defer to my judgment as to which device to use.  I also described the highlights and benefits of the "No-Touch" surgical technique and outcome data including number of procedures previously performed and updated rate of infection. In this initial discussion of the penile implant option, I made sure we had a very long and baldomero discussion about the risks.  I stated that, first and foremost, infection is the most dreaded risk and complication, which range in incidence from 1-3% of all cases performed in the [place], but that in my hands, using the "No-Touch" technique the incidence of infection is less than 1%.  I stated that should infection occur, the entire device would need to be removed, which typically happens in the first several weeks after surgery.  I explained that should this occur, there would likely be corporal fibrosis, scarring, penile shortening and even penile necrosis and disfigurement.  I said that while I would do absolutely everything possible to reduce and mitigate this risk, if it occur, the device would have to be removed, and then a salvage procedure with a semi-rigid implant possibly done, or the device would have to be removed with delayed re-implantation, or simply avoid future surgery completely.  I explained what this salvage procedure is, and that a new implant could be placed in the same setting with a complex irrigation of antibiotics and saline lavage, but that the infection risk at this salvage procedure is even higher, up to 30%. Furthermore the possible need for hospitalization, prolonged intravenous antibiotics and need further additional surgery was also discussed.  The patient was informed that if the salvage operation failed or if the infected implant were to be removed completely that significant shortening of the penis would occur making implantation of another device very difficult with very poor outcome and patient satisfaction. I explained that this is a real and significant risk that has to be weighed and considered.  Next I expounded on the other risks of the operation.  These include injury to the urethra or bladder, and should these occur, the operation would have to be altered or aborted.  I explained that very rarely, vascular injury and bleeding can happen, and if iliac vein injury occurs from reservoir placement, this could be catastrophic and result in major blood loss and theoretically risk of leg loss in severe instances.    I went on to discuss that after the implant is placed, penile shortening could likely occur, and this is up to 1-2cm total.  Some of this is due to lack of glans engorgement, though MUSE could be used post-operatively to reduce this factor.  Next I also explained the risk of dissatisfaction with the cosmetic or functional result of the device, meaning that he could simply be unhappy with the result.  Some people find that while they have a good full erection, they have changes in sensation, difficulty obtaining an orgasm, and dissatisfaction with sex in general.  I made sure he verbalized and demonstrated a good understanding of these points.  Next, I explained the risk of device breakage or failure, and future operations might be needed should this occur to fix the device tubing breakages with fluid leaks.  There is also a risk of auto-inflation, and even inability to successfully use the device due to technical considerations and inability to use or find the pump.  I did state that I would be available to him to teach him and train him to use his device, and also available to treat any other issue mentioned above such asdevice breakage or auto-inflation.  Next we discussed the fact that rarely further minor surgery may be needed to make final adjustments to the penile implant. Reasons for this would be to adjust the length of the cylinders, reposition the pump or location of the reservoir.   Prior to scheduling surgery the patient was asked to read the material which is provided to him today, to see a cardiologist to obtain a medical clearance, to visit our website [email or web address]   to obtain additional information, to call patients who were previously implanted and to discuss his options with his partner. Before leaving the consultation, I made sure he verbalized understanding all the risk and benefits, and pros and cons of replacement surgery.  He had the ability to ask questions, and I also explained to him what to expect from the surgery.

## 2024-02-22 DIAGNOSIS — Z12.5 ENCOUNTER FOR SCREENING FOR MALIGNANT NEOPLASM OF PROSTATE: ICD-10-CM

## 2025-01-11 NOTE — ED ADULT NURSE NOTE - PERIPHERAL VASCULAR
SHIFT SUMMARY
 
PT AOX3, SOME CONFUSION AT TIMES.  PLEASANT AND COOPERATIVE WITH CARE.
REPOSITIONED Q 2 THROUGHOUT THE SHIFT.  PURWICK IN PLACE AND PATENT.  MEPALEX
APPLIED TO COCCYX.  2 BM'S THIS SHIFT.  NO COMPLAINTS BY THE PT.  NO ACUTE
CHANGES.  PT IS NOW MED/TELE STATUS.  FAMILY AT THE BS TODAY.  HEPARIN DRIP
DC'D AND ELIQUIS ADMINSITERED PER THE EMAR.  CALL LIGHT WITHIN REACH, BED
LOCKED AND IN THE LOWEST POSITION.  WILL REPORT TO ONCOMING NURSE. - - -